# Patient Record
Sex: MALE | Race: ASIAN | URBAN - METROPOLITAN AREA
[De-identification: names, ages, dates, MRNs, and addresses within clinical notes are randomized per-mention and may not be internally consistent; named-entity substitution may affect disease eponyms.]

---

## 2017-03-15 ENCOUNTER — OFFICE VISIT (OUTPATIENT)
Dept: BARIATRICS/WEIGHT MGMT | Age: 42
End: 2017-03-15

## 2017-03-15 VITALS
RESPIRATION RATE: 16 BRPM | BODY MASS INDEX: 27.86 KG/M2 | WEIGHT: 177.5 LBS | DIASTOLIC BLOOD PRESSURE: 70 MMHG | HEIGHT: 67 IN | SYSTOLIC BLOOD PRESSURE: 114 MMHG | HEART RATE: 64 BPM

## 2017-03-15 DIAGNOSIS — K52.9 COLITIS: ICD-10-CM

## 2017-03-15 DIAGNOSIS — K92.1 HEMATOCHEZIA: Primary | ICD-10-CM

## 2017-03-15 PROCEDURE — 99214 OFFICE O/P EST MOD 30 MIN: CPT | Performed by: SURGERY

## 2017-03-15 RX ORDER — METRONIDAZOLE 500 MG/1
500 TABLET ORAL 3 TIMES DAILY
Qty: 30 TABLET | Refills: 1 | Status: SHIPPED | OUTPATIENT
Start: 2017-03-15 | End: 2017-03-25

## 2017-03-15 RX ORDER — SULFAMETHOXAZOLE AND TRIMETHOPRIM 800; 160 MG/1; MG/1
1 TABLET ORAL 2 TIMES DAILY
Qty: 20 TABLET | Refills: 1 | Status: SHIPPED | OUTPATIENT
Start: 2017-03-15 | End: 2017-03-25

## 2017-03-16 ASSESSMENT — ENCOUNTER SYMPTOMS
NAUSEA: 0
COLOR CHANGE: 0
PHOTOPHOBIA: 0
SHORTNESS OF BREATH: 0
DIARRHEA: 0
ANAL BLEEDING: 0
VOICE CHANGE: 0
ABDOMINAL PAIN: 1
COUGH: 0
WHEEZING: 0
BLOOD IN STOOL: 1
CONSTIPATION: 0
SORE THROAT: 0
VOMITING: 0
TROUBLE SWALLOWING: 0

## 2017-03-29 ENCOUNTER — OFFICE VISIT (OUTPATIENT)
Dept: BARIATRICS/WEIGHT MGMT | Age: 42
End: 2017-03-29

## 2017-03-29 VITALS
SYSTOLIC BLOOD PRESSURE: 110 MMHG | BODY MASS INDEX: 27.73 KG/M2 | HEIGHT: 67 IN | DIASTOLIC BLOOD PRESSURE: 78 MMHG | HEART RATE: 85 BPM | WEIGHT: 176.7 LBS

## 2017-03-29 DIAGNOSIS — K29.70 GASTRITIS WITHOUT BLEEDING, UNSPECIFIED CHRONICITY, UNSPECIFIED GASTRITIS TYPE: Primary | ICD-10-CM

## 2017-03-29 PROCEDURE — 99213 OFFICE O/P EST LOW 20 MIN: CPT | Performed by: SURGERY

## 2017-03-29 RX ORDER — PANTOPRAZOLE SODIUM 40 MG/1
40 TABLET, DELAYED RELEASE ORAL DAILY
Qty: 30 TABLET | Refills: 1 | Status: SHIPPED | OUTPATIENT
Start: 2017-03-29 | End: 2017-07-05 | Stop reason: ALTCHOICE

## 2017-04-04 ASSESSMENT — ENCOUNTER SYMPTOMS
ABDOMINAL PAIN: 0
ANAL BLEEDING: 0
CONSTIPATION: 0
COUGH: 0
COLOR CHANGE: 0
SORE THROAT: 0
TROUBLE SWALLOWING: 0
NAUSEA: 0
PHOTOPHOBIA: 0
BLOOD IN STOOL: 0
VOMITING: 0
DIARRHEA: 0
SHORTNESS OF BREATH: 0
WHEEZING: 0
VOICE CHANGE: 0

## 2017-07-05 ENCOUNTER — OFFICE VISIT (OUTPATIENT)
Dept: BARIATRICS/WEIGHT MGMT | Age: 42
End: 2017-07-05

## 2017-07-05 VITALS
HEART RATE: 71 BPM | DIASTOLIC BLOOD PRESSURE: 80 MMHG | WEIGHT: 176.6 LBS | SYSTOLIC BLOOD PRESSURE: 118 MMHG | BODY MASS INDEX: 27.72 KG/M2 | HEIGHT: 67 IN

## 2017-07-05 DIAGNOSIS — R10.13 EPIGASTRIC PAIN: Primary | ICD-10-CM

## 2017-07-05 DIAGNOSIS — R10.30 LOWER ABDOMINAL PAIN: ICD-10-CM

## 2017-07-05 DIAGNOSIS — K21.9 GASTROESOPHAGEAL REFLUX DISEASE, ESOPHAGITIS PRESENCE NOT SPECIFIED: ICD-10-CM

## 2017-07-05 PROCEDURE — 99999 PR OFFICE/OUTPT VISIT,PROCEDURE ONLY: CPT | Performed by: SURGERY

## 2017-07-05 RX ORDER — METRONIDAZOLE 500 MG/1
500 TABLET ORAL 3 TIMES DAILY
Qty: 30 TABLET | Refills: 0 | Status: SHIPPED | OUTPATIENT
Start: 2017-07-05 | End: 2017-11-03 | Stop reason: SDUPTHER

## 2017-07-05 RX ORDER — SULFAMETHOXAZOLE AND TRIMETHOPRIM 800; 160 MG/1; MG/1
1 TABLET ORAL 2 TIMES DAILY
Qty: 20 TABLET | Refills: 0 | Status: SHIPPED | OUTPATIENT
Start: 2017-07-05 | End: 2017-07-15

## 2017-07-06 PROBLEM — R10.30 LOWER ABDOMINAL PAIN: Status: ACTIVE | Noted: 2017-07-06

## 2017-07-06 PROBLEM — K21.9 GASTROESOPHAGEAL REFLUX DISEASE: Status: ACTIVE | Noted: 2017-07-06

## 2017-07-06 PROBLEM — R10.13 EPIGASTRIC PAIN: Status: ACTIVE | Noted: 2017-07-06

## 2017-07-06 ASSESSMENT — ENCOUNTER SYMPTOMS
BLOOD IN STOOL: 0
COLOR CHANGE: 0
NAUSEA: 1
PHOTOPHOBIA: 0
SHORTNESS OF BREATH: 0
WHEEZING: 0
VOICE CHANGE: 0
ABDOMINAL DISTENTION: 1
VOMITING: 0
ABDOMINAL PAIN: 1
TROUBLE SWALLOWING: 0
COUGH: 0
DIARRHEA: 0
ANAL BLEEDING: 0
SORE THROAT: 0
CONSTIPATION: 0

## 2017-07-17 ENCOUNTER — TELEPHONE (OUTPATIENT)
Dept: BARIATRICS/WEIGHT MGMT | Age: 42
End: 2017-07-17

## 2017-07-19 ENCOUNTER — PAT TELEPHONE (OUTPATIENT)
Dept: SURGERY | Age: 42
End: 2017-07-19

## 2017-07-19 VITALS — BODY MASS INDEX: 26.68 KG/M2 | WEIGHT: 170 LBS | HEIGHT: 67 IN

## 2017-07-21 ENCOUNTER — HOSPITAL ENCOUNTER (OUTPATIENT)
Dept: SURGERY | Age: 42
Discharge: HOME | End: 2017-07-21
Attending: SURGERY | Admitting: SURGERY

## 2017-07-21 VITALS
SYSTOLIC BLOOD PRESSURE: 118 MMHG | BODY MASS INDEX: 26.83 KG/M2 | TEMPERATURE: 97 F | DIASTOLIC BLOOD PRESSURE: 80 MMHG | HEART RATE: 70 BPM | OXYGEN SATURATION: 97 % | HEIGHT: 68 IN | WEIGHT: 177 LBS | RESPIRATION RATE: 16 BRPM

## 2017-07-21 PROCEDURE — 43239 EGD BIOPSY SINGLE/MULTIPLE: CPT | Performed by: SURGERY

## 2017-07-21 PROCEDURE — S0260 H&P FOR SURGERY: HCPCS | Performed by: SURGERY

## 2017-07-21 PROCEDURE — 45378 DIAGNOSTIC COLONOSCOPY: CPT | Performed by: SURGERY

## 2017-07-21 RX ORDER — PANTOPRAZOLE SODIUM 40 MG/1
40 TABLET, DELAYED RELEASE ORAL DAILY
Qty: 30 TABLET | Refills: 3 | Status: SHIPPED | OUTPATIENT
Start: 2017-07-21 | End: 2017-10-25 | Stop reason: ALTCHOICE

## 2017-07-21 RX ORDER — SODIUM CHLORIDE 9 MG/ML
INJECTION, SOLUTION INTRAVENOUS CONTINUOUS
Status: DISCONTINUED | OUTPATIENT
Start: 2017-07-21 | End: 2017-07-22 | Stop reason: HOSPADM

## 2017-07-21 RX ORDER — SODIUM CHLORIDE, SODIUM LACTATE, POTASSIUM CHLORIDE, CALCIUM CHLORIDE 600; 310; 30; 20 MG/100ML; MG/100ML; MG/100ML; MG/100ML
INJECTION, SOLUTION INTRAVENOUS CONTINUOUS
Status: DISCONTINUED | OUTPATIENT
Start: 2017-07-21 | End: 2017-07-21

## 2017-07-21 RX ADMIN — SODIUM CHLORIDE: 9 INJECTION, SOLUTION INTRAVENOUS at 07:00

## 2017-07-21 ASSESSMENT — PAIN - FUNCTIONAL ASSESSMENT: PAIN_FUNCTIONAL_ASSESSMENT: 0-10

## 2017-07-21 ASSESSMENT — PAIN SCALES - GENERAL
PAINLEVEL_OUTOF10: 0
PAINLEVEL_OUTOF10: 0

## 2017-07-27 DIAGNOSIS — A04.8 HELICOBACTER PYLORI (H. PYLORI): Primary | ICD-10-CM

## 2017-07-27 RX ORDER — LANSOPRAZOLE, AMOXICILLIN, CLARITHROMYCIN 30-500-500
KIT ORAL
Qty: 1 EACH | Refills: 0 | Status: SHIPPED | OUTPATIENT
Start: 2017-07-27 | End: 2017-10-09

## 2017-08-02 ENCOUNTER — OFFICE VISIT (OUTPATIENT)
Dept: BARIATRICS/WEIGHT MGMT | Age: 42
End: 2017-08-02

## 2017-08-02 VITALS
RESPIRATION RATE: 16 BRPM | HEART RATE: 72 BPM | HEIGHT: 70 IN | SYSTOLIC BLOOD PRESSURE: 124 MMHG | WEIGHT: 178.1 LBS | BODY MASS INDEX: 25.5 KG/M2 | DIASTOLIC BLOOD PRESSURE: 77 MMHG

## 2017-08-02 DIAGNOSIS — K21.9 GASTROESOPHAGEAL REFLUX DISEASE WITHOUT ESOPHAGITIS: Primary | ICD-10-CM

## 2017-08-02 DIAGNOSIS — K44.9 HIATAL HERNIA: ICD-10-CM

## 2017-08-02 PROCEDURE — 99024 POSTOP FOLLOW-UP VISIT: CPT | Performed by: SURGERY

## 2017-08-02 ASSESSMENT — ENCOUNTER SYMPTOMS
PHOTOPHOBIA: 0
BLOOD IN STOOL: 0
CONSTIPATION: 0
NAUSEA: 0
COUGH: 0
WHEEZING: 0
COLOR CHANGE: 0
SHORTNESS OF BREATH: 0
VOMITING: 0
SORE THROAT: 0
ANAL BLEEDING: 0
DIARRHEA: 0
VOICE CHANGE: 0
ABDOMINAL PAIN: 1
TROUBLE SWALLOWING: 0

## 2017-08-15 ENCOUNTER — TELEPHONE (OUTPATIENT)
Dept: BARIATRICS/WEIGHT MGMT | Age: 42
End: 2017-08-15

## 2017-10-05 NOTE — ANESTHESIA PRE-OP
Pre-Admission Testing   Pre-Procedural Screening   NP Note    PMH:  Past Medical History:   Diagnosis Date    Hiatal hernia     Teeth missing     Upper And Lower       PSH:    Past Surgical History:   Procedure Laterality Date    CHOLECYSTECTOMY  3/28/16    with choliangiogram    COLONOSCOPY  07/21/2017    Normal    ENDOSCOPY, COLON, DIAGNOSTIC  07/21/2017    EGD: Hiatal hernia, lower esophagitis, mild gastritis        Current Medications:   Not in a hospital admission. Allergies: Allergies   Allergen Reactions    Ciprofloxacin Itching             Social History:  Social History     Social History    Marital status:      Spouse name: N/A    Number of children: N/A    Years of education: N/A     Occupational History    Not on file. Social History Main Topics    Smoking status: Current Every Day Smoker     Packs/day: 1.00     Years: 28.00     Types: Cigarettes, Pipe     Start date: 1989    Smokeless tobacco: Never Used    Alcohol use No    Drug use: No    Sexual activity: Yes     Partners: Female     Other Topics Concern    Not on file     Social History Narrative    No narrative on file        Recent Vitals:  Vitals:    10/09/17 1000   BP: 133/81   Pulse: 72   Resp: 16   Temp: 97.2 °F (36.2 °C)   SpO2: 98%     Wt Readings from Last 3 Encounters:   08/02/17 178 lb 1.6 oz (80.8 kg)   07/21/17 177 lb (80.3 kg)   07/19/17 170 lb (77.1 kg)      Ht Readings from Last 3 Encounters:   08/02/17 5' 10\" (1.778 m)   07/21/17 5' 8\" (1.727 m)   07/19/17 5' 7\" (1.702 m)     There is no height or weight on file to calculate BMI.     Wt Readings from Last 3 Encounters:   08/02/17 178 lb 1.6 oz (80.8 kg)   07/21/17 177 lb (80.3 kg)   07/19/17 170 lb (77.1 kg)       Present on Admission:  **None**       Anesthesia Alerts:  no     Malignant Hyperthermia:  no    History of Sleep Apnea:   No    REVIEW OF SYSTEMS:      EYES:     HEENT: neg     RESPIRATORY:   Smoker: 1ppd x 28 years  Able to lie flat:  Yes CARDIOVASCULAR:  Denies CP, SOB, palpitations, or dizziness. GASTROINTESTINAL:  Controlled reflux, on PPI. s/p marilynn, 2016. EGD/C-scope 7/2017. Mild gastritis. Hiatal hernia, planned fundoplication    GENITOURINARY:  Neg     INTEGUMENT: neg    HEMATOLOGIC/LYMPHATIC: neg    ENDOCRINE: neg    MUSCULOSKELETAL: neg     NEUROLOGICAL:  Neg    BEHAVIOR/PSYCH:  Alert and oriented x 4. Questions answered. Understanding verbalized    PHYSICAL EXAM:  Airway Exam:  Head/Neck movement: full  Thyromental Distance: > 3 FB  History of difficult intubation:  None  Mallampati Classification:  Class II  Teeth: poor dentition. Denies loose or chipped teeth        LUNGS:  No increased work of breathing, good air exchange, clear to auscultation but diminished bases. CARDIOVASCULAR:  Normal apical impulse, regular rate and rhythm, normal S1 and S2, no S3 or S4, and no murmur noted    Testing Results:    EKG:  NSR, HR 69  10/9/2017  LABS:      CBC  Lab Results   Component Value Date/Time    WBC 9.3 10/09/2017 08:00 AM    HGB 15.4 10/09/2017 08:00 AM    HCT 47.1 10/09/2017 08:00 AM     10/09/2017 08:00 AM     RENAL  Lab Results   Component Value Date/Time     10/09/2017 08:00 AM    K 4.1 10/09/2017 08:00 AM    CL 97 (L) 10/09/2017 08:00 AM    CO2 21 10/09/2017 08:00 AM    BUN 12 10/09/2017 08:00 AM    CREATININE 0.7 (L) 10/09/2017 08:00 AM    GLUCOSE 167 (H) 10/09/2017 08:00 AM     Summary:  Chart reviewed, pt. Interviewed and examined. Planned surgical procedure:  Robotic Hiatal Hernia Repair & Nissen Fundoplication     1. B/p: WNL. EKG: NSR. Denies CP or SOB. No regular exercise. 2.  Current smoker, hx 1ppd x 28 years. Able to lie flat. 3.  Controlled reflux, on PPI. s/p marilynn, 2016. EGD/C-scope 7/2017. Mild gastritis. Hiatal hernia, planned fundoplication    4. Poor dentition. Multiple missing upper and lower teeth. Denies loose or chipped teeth. 5.  Random BS today: 167. Denies hx DM.     Anesthesia Evaluation will follow by a certified practitioner prior to surgery.     Electronically signed by Farhad Goode NP on 10/5/2017 at 1:07 PM

## 2017-10-09 ENCOUNTER — HOSPITAL ENCOUNTER (OUTPATIENT)
Dept: PREADMISSION TESTING | Age: 42
Discharge: HOME | End: 2017-10-09
Attending: SURGERY | Admitting: SURGERY

## 2017-10-09 VITALS
HEIGHT: 69 IN | OXYGEN SATURATION: 98 % | WEIGHT: 184.25 LBS | DIASTOLIC BLOOD PRESSURE: 81 MMHG | TEMPERATURE: 97.2 F | BODY MASS INDEX: 27.29 KG/M2 | SYSTOLIC BLOOD PRESSURE: 133 MMHG | RESPIRATION RATE: 16 BRPM | HEART RATE: 72 BPM

## 2017-10-09 LAB
ANION GAP SERPL CALCULATED.3IONS-SCNC: 17 MMOL/L (ref 4–16)
BASOPHILS ABSOLUTE: 0.1 K/CU MM
BASOPHILS RELATIVE PERCENT: 0.6 % (ref 0–1)
BUN BLDV-MCNC: 12 MG/DL (ref 6–23)
CALCIUM SERPL-MCNC: 9.4 MG/DL (ref 8.3–10.6)
CHLORIDE BLD-SCNC: 97 MMOL/L (ref 99–110)
CO2: 21 MMOL/L (ref 21–32)
CREAT SERPL-MCNC: 0.7 MG/DL (ref 0.9–1.3)
DIFFERENTIAL TYPE: ABNORMAL
EKG ATRIAL RATE: 69 BPM
EKG DIAGNOSIS: NORMAL
EKG P AXIS: 16 DEGREES
EKG P-R INTERVAL: 140 MS
EKG Q-T INTERVAL: 392 MS
EKG QRS DURATION: 90 MS
EKG QTC CALCULATION (BAZETT): 420 MS
EKG R AXIS: 12 DEGREES
EKG T AXIS: 48 DEGREES
EKG VENTRICULAR RATE: 69 BPM
EOSINOPHILS ABSOLUTE: 0.6 K/CU MM
EOSINOPHILS RELATIVE PERCENT: 6.2 % (ref 0–3)
GFR AFRICAN AMERICAN: >60 ML/MIN/1.73M2
GFR NON-AFRICAN AMERICAN: >60 ML/MIN/1.73M2
GLUCOSE BLD-MCNC: 167 MG/DL (ref 70–140)
HCT VFR BLD CALC: 47.1 % (ref 42–52)
HEMOGLOBIN: 15.4 GM/DL (ref 13.5–18)
IMMATURE NEUTROPHIL %: 0.4 % (ref 0–0.43)
LYMPHOCYTES ABSOLUTE: 3 K/CU MM
LYMPHOCYTES RELATIVE PERCENT: 32 % (ref 24–44)
MCH RBC QN AUTO: 31.2 PG (ref 27–31)
MCHC RBC AUTO-ENTMCNC: 32.7 % (ref 32–36)
MCV RBC AUTO: 95.3 FL (ref 78–100)
MONOCYTES ABSOLUTE: 0.6 K/CU MM
MONOCYTES RELATIVE PERCENT: 6.6 % (ref 0–4)
NUCLEATED RBC %: 0 %
PDW BLD-RTO: 12.9 % (ref 11.7–14.9)
PLATELET # BLD: 163 K/CU MM (ref 140–440)
PMV BLD AUTO: 11.9 FL (ref 7.5–11.1)
POTASSIUM SERPL-SCNC: 4.1 MMOL/L (ref 3.5–5.1)
RBC # BLD: 4.94 M/CU MM (ref 4.6–6.2)
SEGMENTED NEUTROPHILS ABSOLUTE COUNT: 5 K/CU MM
SEGMENTED NEUTROPHILS RELATIVE PERCENT: 54.2 % (ref 36–66)
SODIUM BLD-SCNC: 135 MMOL/L (ref 135–145)
TOTAL IMMATURE NEUTOROPHIL: 0.04 K/CU MM
TOTAL NUCLEATED RBC: 0 K/CU MM
WBC # BLD: 9.3 K/CU MM (ref 4–10.5)

## 2017-10-09 RX ORDER — CEFAZOLIN SODIUM 2 G/100ML
2 INJECTION, SOLUTION INTRAVENOUS ONCE
Status: CANCELLED | OUTPATIENT
Start: 2017-10-16

## 2017-10-09 ASSESSMENT — PAIN DESCRIPTION - PAIN TYPE: TYPE: ACUTE PAIN

## 2017-10-13 ENCOUNTER — TELEPHONE (OUTPATIENT)
Dept: BARIATRICS/WEIGHT MGMT | Age: 42
End: 2017-10-13

## 2017-10-13 NOTE — TELEPHONE ENCOUNTER
Karyn Rick [phone] spoke to Bandar Gage 17 pending ref# [id number] and faxed clinicals to [phone]

## 2017-10-25 ENCOUNTER — OFFICE VISIT (OUTPATIENT)
Dept: BARIATRICS/WEIGHT MGMT | Age: 42
End: 2017-10-25

## 2017-10-25 VITALS
WEIGHT: 181.4 LBS | HEIGHT: 69 IN | HEART RATE: 78 BPM | RESPIRATION RATE: 12 BRPM | SYSTOLIC BLOOD PRESSURE: 127 MMHG | BODY MASS INDEX: 26.87 KG/M2 | DIASTOLIC BLOOD PRESSURE: 80 MMHG

## 2017-10-25 DIAGNOSIS — Z98.890 S/P NISSEN FUNDOPLICATION (WITHOUT GASTROSTOMY TUBE) PROCEDURE: ICD-10-CM

## 2017-10-25 DIAGNOSIS — K44.9 PARAESOPHAGEAL HIATAL HERNIA: Primary | ICD-10-CM

## 2017-10-25 PROCEDURE — 99024 POSTOP FOLLOW-UP VISIT: CPT | Performed by: SURGERY

## 2017-10-25 NOTE — PROGRESS NOTES
GENERAL SURGERY OFFICE NOTE    SUBJECTIVE:    Patient presenting today referred from [facility] and No ref. provider found, for   Chief Complaint   Patient presents with    Post-Op Check     PO#1 OR 10/16/17 HHR/NISSEN   .    HPI: Matilde Leonardo is a 36 y.o. male presenting in first, follow up 1 weeks post op 10/16/17. PROCEDURES DONE:   1. Laparoscopic da Emi-assisted robotic paraesophageal hiatal  Herniorrhaphy with Bio-A mesh 10 cm x 7 cm . 2. Nissen-Mary Beth floppy fundoplication - around a 60 Fr Bougie. Doing VERY WELL - GERD COMPLETELY resolved - off the PPIs and H2 blockers and tums; Some occasional hiccups - counseled -   Overall very satisfied from the results of the surgery. F/u 4 wks. RTW Nov 3rd    Wounds very nicely healing, no erythema, no induration, no purulent discharge. No constipation, BMs back to normal.    Thoroughly reviewed the patient's medical history, family history, social history and review of systems with the patient today in the office. Please see medical record for pertinent positives. Past Medical History:   Diagnosis Date    Hiatal hernia     Teeth missing     Upper And Lower      Past Surgical History:   Procedure Laterality Date    ABDOMEN SURGERY      CHOLECYSTECTOMY, LAPAROSCOPIC  3/28/16    COLONOSCOPY  07/21/2017    Normal    DENTAL SURGERY      Teeth Extracted In Past    ENDOSCOPY, COLON, DIAGNOSTIC  07/21/2017    EGD: Hiatal hernia, lower esophagitis, mild gastritis    HERNIA REPAIR      OTHER SURGICAL HISTORY  10/16/2017    robotic assisit lap nissen fundoplication with mesh, hiatal hernia repair     No current outpatient prescriptions on file. No current facility-administered medications for this visit.        Allergies   Allergen Reactions    Ciprofloxacin Itching           Review of Systems      OBJECTIVE:    /80 (Site: Right Arm, Position: Sitting, Cuff Size: Medium Adult)   Pulse 78   Resp 12   Ht 5' 9\" (1.753 m)   Wt 181 lb 6.4 oz (82.3 kg)   BMI 26.79 kg/m²      Physical Exam      ASSESSMENT & PLAN:    1. Paraesophageal hiatal hernia    2. S/P Nissen fundoplication (without gastrostomy tube) procedure           Doing VERY WELL - GERD COMPLETELY resolved - off the PPIs and H2 blockers and tums; Some occasional hiccups - counseled -   Overall very satisfied from the results of the surgery. F/u 4 wks. RTW Nov 3rd      Patient counseled on the risks, benefits, and alternatives of treatment plan at length while in the office today. Patient states an understanding and willingness to proceed with the plan. No orders of the defined types were placed in this encounter. No orders of the defined types were placed in this encounter. Follow Up:  Return in about 4 weeks (around 11/22/2017) for Follow up Symptoms. Mason Haines MD, FACS, FICS.     10/25/17

## 2017-10-26 PROBLEM — Z98.890 S/P NISSEN FUNDOPLICATION (WITHOUT GASTROSTOMY TUBE) PROCEDURE: Status: ACTIVE | Noted: 2017-10-26

## 2017-11-03 DIAGNOSIS — R19.7 DIARRHEA OF PRESUMED INFECTIOUS ORIGIN: Primary | ICD-10-CM

## 2017-11-03 RX ORDER — METRONIDAZOLE 500 MG/1
500 TABLET ORAL 3 TIMES DAILY
Qty: 30 TABLET | Refills: 1 | Status: SHIPPED | OUTPATIENT
Start: 2017-11-03 | End: 2017-11-13

## 2017-11-28 RX ORDER — GREEN TEA/HOODIA GORDONII 315-12.5MG
1 CAPSULE ORAL DAILY
Qty: 30 TABLET | Refills: 5 | Status: SHIPPED | OUTPATIENT
Start: 2017-11-28

## 2017-11-28 RX ORDER — SIMETHICONE 80 MG
80 TABLET,CHEWABLE ORAL 4 TIMES DAILY PRN
Qty: 180 TABLET | Refills: 3 | Status: SHIPPED | OUTPATIENT
Start: 2017-11-28

## 2017-11-28 RX ORDER — CLINDAMYCIN HYDROCHLORIDE 300 MG/1
300 CAPSULE ORAL 3 TIMES DAILY
Qty: 30 CAPSULE | Refills: 0 | Status: SHIPPED | OUTPATIENT
Start: 2017-11-28 | End: 2017-12-08

## 2017-12-06 ENCOUNTER — TELEPHONE (OUTPATIENT)
Dept: BARIATRICS/WEIGHT MGMT | Age: 42
End: 2017-12-06

## 2024-11-04 ENCOUNTER — OFFICE VISIT (OUTPATIENT)
Dept: INTERNAL MEDICINE | Facility: CLINIC | Age: 49
End: 2024-11-04

## 2024-11-04 ENCOUNTER — LAB VISIT (OUTPATIENT)
Dept: LAB | Facility: HOSPITAL | Age: 49
End: 2024-11-04

## 2024-11-04 VITALS
TEMPERATURE: 98 F | RESPIRATION RATE: 19 BRPM | HEIGHT: 69 IN | OXYGEN SATURATION: 100 % | SYSTOLIC BLOOD PRESSURE: 132 MMHG | DIASTOLIC BLOOD PRESSURE: 86 MMHG | BODY MASS INDEX: 25.21 KG/M2 | WEIGHT: 170.19 LBS | HEART RATE: 54 BPM

## 2024-11-04 DIAGNOSIS — R00.1 BRADYCARDIA: ICD-10-CM

## 2024-11-04 DIAGNOSIS — Z12.2 ENCOUNTER FOR SCREENING FOR LUNG CANCER: ICD-10-CM

## 2024-11-04 DIAGNOSIS — Z13.9 ENCOUNTER FOR HEALTH-RELATED SCREENING: ICD-10-CM

## 2024-11-04 DIAGNOSIS — E78.5 HYPERLIPIDEMIA, UNSPECIFIED HYPERLIPIDEMIA TYPE: Primary | ICD-10-CM

## 2024-11-04 DIAGNOSIS — Z13.9 ENCOUNTER FOR HEALTH-RELATED SCREENING: Primary | ICD-10-CM

## 2024-11-04 DIAGNOSIS — F17.200 NEEDS SMOKING CESSATION EDUCATION: ICD-10-CM

## 2024-11-04 LAB
ALBUMIN SERPL-MCNC: 4.4 G/DL (ref 3.5–5)
ALBUMIN/GLOB SERPL: 1.2 RATIO (ref 1.1–2)
ALP SERPL-CCNC: 91 UNIT/L (ref 40–150)
ALT SERPL-CCNC: 16 UNIT/L (ref 0–55)
ANION GAP SERPL CALC-SCNC: 9 MEQ/L
AST SERPL-CCNC: 18 UNIT/L (ref 5–34)
BACTERIA #/AREA URNS AUTO: ABNORMAL /HPF
BASOPHILS # BLD AUTO: 0.05 X10(3)/MCL
BASOPHILS NFR BLD AUTO: 0.8 %
BILIRUB SERPL-MCNC: 0.9 MG/DL
BILIRUB UR QL STRIP.AUTO: NEGATIVE
BUN SERPL-MCNC: 19.8 MG/DL (ref 8.4–25.7)
CALCIUM SERPL-MCNC: 10.1 MG/DL (ref 8.4–10.2)
CHLORIDE SERPL-SCNC: 103 MMOL/L (ref 98–107)
CHOLEST SERPL-MCNC: 250 MG/DL
CHOLEST/HDLC SERPL: 5 {RATIO} (ref 0–5)
CLARITY UR: CLEAR
CO2 SERPL-SCNC: 26 MMOL/L (ref 22–29)
COLOR UR AUTO: COLORLESS
CREAT SERPL-MCNC: 0.94 MG/DL (ref 0.72–1.25)
CREAT/UREA NIT SERPL: 21
EOSINOPHIL # BLD AUTO: 0.23 X10(3)/MCL (ref 0–0.9)
EOSINOPHIL NFR BLD AUTO: 3.7 %
ERYTHROCYTE [DISTWIDTH] IN BLOOD BY AUTOMATED COUNT: 13.3 % (ref 11.5–17)
EST. AVERAGE GLUCOSE BLD GHB EST-MCNC: 119.8 MG/DL
GFR SERPLBLD CREATININE-BSD FMLA CKD-EPI: >60 ML/MIN/1.73/M2
GLOBULIN SER-MCNC: 3.7 GM/DL (ref 2.4–3.5)
GLUCOSE SERPL-MCNC: 116 MG/DL (ref 74–100)
GLUCOSE UR QL STRIP: NORMAL
HAV IGM SERPL QL IA: NONREACTIVE
HBA1C MFR BLD: 5.8 %
HBV CORE IGM SERPL QL IA: NONREACTIVE
HBV SURFACE AG SERPL QL IA: NONREACTIVE
HCT VFR BLD AUTO: 45 % (ref 42–52)
HCV AB SERPL QL IA: NONREACTIVE
HDLC SERPL-MCNC: 50 MG/DL (ref 35–60)
HGB BLD-MCNC: 14.9 G/DL (ref 14–18)
HGB UR QL STRIP: NEGATIVE
HIV 1+2 AB+HIV1 P24 AG SERPL QL IA: NONREACTIVE
HYALINE CASTS #/AREA URNS LPF: ABNORMAL /LPF
IMM GRANULOCYTES # BLD AUTO: 0.05 X10(3)/MCL (ref 0–0.04)
IMM GRANULOCYTES NFR BLD AUTO: 0.8 %
KETONES UR QL STRIP: NEGATIVE
LDLC SERPL CALC-MCNC: 182 MG/DL (ref 50–140)
LEUKOCYTE ESTERASE UR QL STRIP: NEGATIVE
LYMPHOCYTES # BLD AUTO: 2.46 X10(3)/MCL (ref 0.6–4.6)
LYMPHOCYTES NFR BLD AUTO: 39.8 %
MCH RBC QN AUTO: 31 PG (ref 27–31)
MCHC RBC AUTO-ENTMCNC: 33.1 G/DL (ref 33–36)
MCV RBC AUTO: 93.8 FL (ref 80–94)
MONOCYTES # BLD AUTO: 0.49 X10(3)/MCL (ref 0.1–1.3)
MONOCYTES NFR BLD AUTO: 7.9 %
NEUTROPHILS # BLD AUTO: 2.9 X10(3)/MCL (ref 2.1–9.2)
NEUTROPHILS NFR BLD AUTO: 47 %
NITRITE UR QL STRIP: NEGATIVE
NRBC BLD AUTO-RTO: 0 %
PH UR STRIP: 5.5 [PH]
PLATELET # BLD AUTO: 187 X10(3)/MCL (ref 130–400)
PMV BLD AUTO: 12.9 FL (ref 7.4–10.4)
POTASSIUM SERPL-SCNC: 4.7 MMOL/L (ref 3.5–5.1)
PROT SERPL-MCNC: 8.1 GM/DL (ref 6.4–8.3)
PROT UR QL STRIP: NEGATIVE
RBC # BLD AUTO: 4.8 X10(6)/MCL (ref 4.7–6.1)
RBC #/AREA URNS AUTO: ABNORMAL /HPF
SODIUM SERPL-SCNC: 138 MMOL/L (ref 136–145)
SP GR UR STRIP.AUTO: 1.01 (ref 1–1.03)
SQUAMOUS #/AREA URNS LPF: ABNORMAL /HPF
TRIGL SERPL-MCNC: 91 MG/DL (ref 34–140)
TSH SERPL-ACNC: 0.8 UIU/ML (ref 0.35–4.94)
UROBILINOGEN UR STRIP-ACNC: NORMAL
VLDLC SERPL CALC-MCNC: 18 MG/DL
WBC # BLD AUTO: 6.18 X10(3)/MCL (ref 4.5–11.5)
WBC #/AREA URNS AUTO: ABNORMAL /HPF

## 2024-11-04 PROCEDURE — 80053 COMPREHEN METABOLIC PANEL: CPT

## 2024-11-04 PROCEDURE — 80074 ACUTE HEPATITIS PANEL: CPT

## 2024-11-04 PROCEDURE — 80061 LIPID PANEL: CPT

## 2024-11-04 PROCEDURE — 81001 URINALYSIS AUTO W/SCOPE: CPT

## 2024-11-04 PROCEDURE — 87389 HIV-1 AG W/HIV-1&-2 AB AG IA: CPT

## 2024-11-04 PROCEDURE — 84443 ASSAY THYROID STIM HORMONE: CPT

## 2024-11-04 PROCEDURE — 83036 HEMOGLOBIN GLYCOSYLATED A1C: CPT

## 2024-11-04 PROCEDURE — 36415 COLL VENOUS BLD VENIPUNCTURE: CPT

## 2024-11-04 PROCEDURE — 99203 OFFICE O/P NEW LOW 30 MIN: CPT | Mod: PBBFAC

## 2024-11-04 PROCEDURE — 85025 COMPLETE CBC W/AUTO DIFF WBC: CPT

## 2024-11-04 RX ORDER — ATORVASTATIN CALCIUM 40 MG/1
40 TABLET, FILM COATED ORAL DAILY
Qty: 90 TABLET | Refills: 3 | Status: SHIPPED | OUTPATIENT
Start: 2024-11-04 | End: 2025-11-04

## 2024-11-04 NOTE — PROGRESS NOTES
I have reviewed and concur with the resident's history, physical, assessment, and plan.  I have discussed with him all issues related to the diagnosis, workup and treatment plan. Care provided as reasonable and necessary.    Paul Goodman MD  [facility]

## 2024-11-04 NOTE — PROGRESS NOTES
ASCVD 10.9%, patient needs to be on high intensity statin.    Jayant Alva MD  [facility] PGY-III

## 2024-11-04 NOTE — PROGRESS NOTES
[facility] INTERNAL MEDICINE RESIDENT CLINIC    Subjective:      Bailey Velasquez is a 49 y.o. male who  has no past medical history on file.  He presents to clinic today to establish care.     Patient has no subjective complaints today such as chest pain, shortness for breath, dizziness, nausea, vomiting, LOC.  He stated he had some episodes of urinary incontinence twice or thrice whenever he eats too much sugar, but not constantly.  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 He had an endoscopy, colonoscopy as well as gallbladder surgery done 5 years ago with Dr. Borrero at [place], c-scope was reportedly normal, he reportedly had his stomach hernia fixed at that time, currently he denies reflux symptoms. Will obtain med records.    Patient smokes 1-2 packs a day for more than 30 years now, contemplating on quitting.  He drinks alcohol once every few months usually a bottle of beer.  He smokes marijuana rarely once or twice a month.    Review of Systems:  General: No fever, fatigue, weight loss  Cardiovascular: No chest pain, palpitations, orthopnea, PND  Respiratory: No cough, hemoptysis, shortness of breath, wheezing  Gastrointestinal: No nausea, vomiting, abdominal pain, diarrhea, melena, hematochezia  Genitourinary: No dysuria, urgency, frequency, hematuria  Musculoskeletal: No myalgia, joint pain, falls  Neurological: No dizziness, headache, sensory changes, focal weakness    Objective:     Vital Signs:  Vitals:    11/04/24 0833   BP: (!) 138/92   Pulse: (!) 54   Resp: 19   Temp: 97.6 °F (36.4 °C)      Repeat /86    Body mass index is 25.13 kg/m².     General:  Examined a well-developed patient in no acute respiratory distress  HENT: Normocephalic, atraumatic, PERRL, neck supple  Cardiovascular:  Borderline bradycardic and rhythm, no murmurs appreciated  Pulmonary:  Clear to auscultation bilaterally, no wheezes, rales, or rhonci  Abdominal:  Soft,  "non-tender, non-distended, normoactive bowel sounds  MSK:  No muscle atrophy, cyanosis, peripheral edema, full range of motion  Skin:  No rashes, ulcers, erythema  Neuro:  Alert and oriented x3, no focal neuro deficits, CNII-XII grossly intact    Laboratory:  No results found for: "WBC", "HGB", "HCT", "PLT", "MCV", "RDW" No results found for: "NA", "K", "CL", "CO2", "BUN", "CREATININE", "GLU", "CALCIUM", "MG", "PHOS"   No results found for: "HGBA1C", "EAG", "GLUF", "MICROALBUR", "LDLCALC", "CREATININE", "CREATRANDUR", "PROTEINURINE" No results found for: "TSH", "[id number]", "H2EOIFL", "F1XNSIQ", "THYROIDAB"     Current Medications:  No current outpatient medications     Assessment and Plan:   Bailey Velasquez is a 49 y.o. male who  has no past medical history on file.      Elevated blood pressure  -Closely monitor blood pressure, if elevated on next visit, will start him on antihypertensive regimen  -Lifestyle modifications advised, expressed understanding    Tobacco use   -Cessation discussed  -LDCT ordered, will have to see if patient can cover for expenses    Follow-up on: 6 months    Health Maintenance:  Patient wants to discuss these when he has insurance  Influenza vaccine: -   Pneumococcal vaccine (>64 yo): N/A  Shingrix vaccine (>51 yo): -  TDAP: -    AAA screening (men 65-76 yo smokers): N/A  Breast cancer screening (women 40-73 yo): N/A  Cervical cancer screening (women 21-64 yo): N/A  Colon cancer screening (45-76 yo): C-scope in 2019 at [facility] with Dr. Ofe Borrero, will get records  Lung cancer screening (50-81 yo): -  Osteoporosis screening (>64 yo, or <65 at risk): N/A     Health Maintenance   Topic Date Due    Hepatitis C Screening  Never done    Lipid Panel  Never done    TETANUS VACCINE  Never done    Colorectal Cancer Screening  Never done    Shingles Vaccine (1 of 2) Never done        Orders Placed This Encounter    CBC Auto Differential    Comprehensive Metabolic Panel    TSH    HIV " 1/2 Ag/Ab (4th Gen)    Lipid Panel    Hepatitis Panel, Acute    Hemoglobin A1C    Urinalysis        Jayant Alva MD  [employer] [facility], PGY-III

## 2024-11-06 ENCOUNTER — TELEPHONE (OUTPATIENT)
Dept: INTERNAL MEDICINE | Facility: CLINIC | Age: 49
End: 2024-11-06

## 2024-11-06 NOTE — TELEPHONE ENCOUNTER
----- Message from Jayant Alva sent at 11/4/2024  1:20 PM CST -----  Patient's cholesterol is high, he needs to be on a cholesterol pill. I prescribed Lipitor 40 mg once daily and sent it to [facility] so he can get it cheaper. Pls let patient know. Thank you.  ----- Message -----  From: Lab, Background User  Sent: 11/4/2024  10:16 AM CST  To: Jayant Alva MD

## 2024-11-06 NOTE — TELEPHONE ENCOUNTER
Place call to listed number to contact patient. Pt unavailable at present time. Left number with Ms.Yaz at [facility] to have patient to  [facility]  ASAP for test results.

## 2024-11-08 ENCOUNTER — TELEPHONE (OUTPATIENT)
Dept: INTERNAL MEDICINE | Facility: CLINIC | Age: 49
End: 2024-11-08

## 2024-11-08 NOTE — TELEPHONE ENCOUNTER
Pt contacted at shelter. Unavailable at present time.Spoke to Rep. [facility] patient will be transported to pharmacy to  his Rx on Monday.

## 2024-11-08 NOTE — TELEPHONE ENCOUNTER
----- Message from Jayant Alva sent at 11/4/2024  1:20 PM CST -----  Patient's cholesterol is high, he needs to be on a cholesterol pill. I prescribed Lipitor 40 mg once daily and sent it to [facility] so he can get it cheaper. Pls let patient know. Thank you.  ----- Message -----  From: Lab, Background User  Sent: 11/4/2024  10:16 AM CST  To: Jayant Alva MD